# Patient Record
Sex: FEMALE | URBAN - METROPOLITAN AREA
[De-identification: names, ages, dates, MRNs, and addresses within clinical notes are randomized per-mention and may not be internally consistent; named-entity substitution may affect disease eponyms.]

---

## 2022-08-08 ENCOUNTER — TELEPHONE (OUTPATIENT)
Dept: INTERNAL MEDICINE CLINIC | Facility: CLINIC | Age: 27
End: 2022-08-08

## 2022-08-08 NOTE — TELEPHONE ENCOUNTER
Patient is requesting an appointment to see provider sooner. Patient mom is see provider. She states that Provider said she would see her sooner.

## 2022-08-09 NOTE — TELEPHONE ENCOUNTER
Her mom Rk Rogers had asked us to schedule her appointment. Can do 3:30 PM on August 19, 2022 at home still office for physical for new patient.

## 2022-08-18 PROBLEM — Z12.4 PAP SMEAR FOR CERVICAL CANCER SCREENING: Status: ACTIVE | Noted: 2022-08-18

## 2022-08-18 PROBLEM — Z71.85 VACCINE COUNSELING: Status: ACTIVE | Noted: 2022-08-18

## 2022-08-18 PROBLEM — Z00.00 ADULT GENERAL MEDICAL EXAMINATION: Status: ACTIVE | Noted: 2022-08-18

## 2022-08-18 RX ORDER — MOMETASONE FUROATE 1 MG/G
CREAM TOPICAL
COMMUNITY
Start: 2015-09-18 | End: 2022-08-22

## 2022-08-19 ENCOUNTER — OFFICE VISIT (OUTPATIENT)
Dept: INTERNAL MEDICINE CLINIC | Facility: CLINIC | Age: 27
End: 2022-08-19
Payer: COMMERCIAL

## 2022-08-19 VITALS
WEIGHT: 117 LBS | RESPIRATION RATE: 16 BRPM | DIASTOLIC BLOOD PRESSURE: 70 MMHG | SYSTOLIC BLOOD PRESSURE: 110 MMHG | OXYGEN SATURATION: 100 % | HEIGHT: 59 IN | BODY MASS INDEX: 23.59 KG/M2 | HEART RATE: 76 BPM | TEMPERATURE: 99 F

## 2022-08-19 DIAGNOSIS — L70.8 OTHER ACNE: ICD-10-CM

## 2022-08-19 DIAGNOSIS — G44.52 NEW DAILY PERSISTENT HEADACHE: ICD-10-CM

## 2022-08-19 DIAGNOSIS — Z12.4 PAP SMEAR FOR CERVICAL CANCER SCREENING: ICD-10-CM

## 2022-08-19 DIAGNOSIS — R14.0 BLOATED ABDOMEN: ICD-10-CM

## 2022-08-19 DIAGNOSIS — Z71.85 VACCINE COUNSELING: ICD-10-CM

## 2022-08-19 DIAGNOSIS — Z00.00 ADULT GENERAL MEDICAL EXAMINATION: Primary | ICD-10-CM

## 2022-08-19 LAB
ALBUMIN SERPL-MCNC: 4.1 G/DL (ref 3.4–5)
ALBUMIN/GLOB SERPL: 1.1 {RATIO} (ref 1–2)
ALP LIVER SERPL-CCNC: 63 U/L
ALT SERPL-CCNC: 31 U/L
ANION GAP SERPL CALC-SCNC: 7 MMOL/L (ref 0–18)
APPEARANCE: CLEAR
AST SERPL-CCNC: 20 U/L (ref 15–37)
BASOPHILS # BLD AUTO: 0.04 X10(3) UL (ref 0–0.2)
BASOPHILS NFR BLD AUTO: 0.7 %
BILIRUB SERPL-MCNC: 0.6 MG/DL (ref 0.1–2)
BILIRUBIN: NEGATIVE
BUN BLD-MCNC: 14 MG/DL (ref 7–18)
BUN/CREAT SERPL: 19.7 (ref 10–20)
CALCIUM BLD-MCNC: 9 MG/DL (ref 8.5–10.1)
CHLORIDE SERPL-SCNC: 108 MMOL/L (ref 98–112)
CHOLEST SERPL-MCNC: 178 MG/DL (ref ?–200)
CO2 SERPL-SCNC: 25 MMOL/L (ref 21–32)
CREAT BLD-MCNC: 0.71 MG/DL
DEPRECATED RDW RBC AUTO: 41 FL (ref 35.1–46.3)
EOSINOPHIL # BLD AUTO: 0.18 X10(3) UL (ref 0–0.7)
EOSINOPHIL NFR BLD AUTO: 3.2 %
ERYTHROCYTE [DISTWIDTH] IN BLOOD BY AUTOMATED COUNT: 11.9 % (ref 11–15)
FASTING PATIENT LIPID ANSWER: YES
FASTING STATUS PATIENT QL REPORTED: YES
FSH SERPL-ACNC: 5.3 MIU/ML
GFR SERPLBLD BASED ON 1.73 SQ M-ARVRAT: 118 ML/MIN/1.73M2 (ref 60–?)
GLOBULIN PLAS-MCNC: 3.9 G/DL (ref 2.8–4.4)
GLUCOSE (URINE DIPSTICK): NEGATIVE MG/DL
GLUCOSE BLD-MCNC: 85 MG/DL (ref 70–99)
HCT VFR BLD AUTO: 41.3 %
HDLC SERPL-MCNC: 53 MG/DL (ref 40–59)
HGB BLD-MCNC: 13.5 G/DL
IGA SERPL-MCNC: 139 MG/DL (ref 70–312)
IMM GRANULOCYTES # BLD AUTO: 0.01 X10(3) UL (ref 0–1)
IMM GRANULOCYTES NFR BLD: 0.2 %
KETONES (URINE DIPSTICK): NEGATIVE MG/DL
LDLC SERPL CALC-MCNC: 112 MG/DL (ref ?–100)
LEUKOCYTES: NEGATIVE
LH SERPL-ACNC: 6.4 MIU/ML
LYMPHOCYTES # BLD AUTO: 1.53 X10(3) UL (ref 1–4)
LYMPHOCYTES NFR BLD AUTO: 26.9 %
MCH RBC QN AUTO: 30.3 PG (ref 26–34)
MCHC RBC AUTO-ENTMCNC: 32.7 G/DL (ref 31–37)
MCV RBC AUTO: 92.6 FL
MONOCYTES # BLD AUTO: 0.83 X10(3) UL (ref 0.1–1)
MONOCYTES NFR BLD AUTO: 14.6 %
MULTISTIX LOT#: ABNORMAL NUMERIC
NEUTROPHILS # BLD AUTO: 3.09 X10 (3) UL (ref 1.5–7.7)
NEUTROPHILS # BLD AUTO: 3.09 X10(3) UL (ref 1.5–7.7)
NEUTROPHILS NFR BLD AUTO: 54.4 %
NITRITE, URINE: NEGATIVE
NONHDLC SERPL-MCNC: 125 MG/DL (ref ?–130)
OSMOLALITY SERPL CALC.SUM OF ELEC: 290 MOSM/KG (ref 275–295)
PH, URINE: 5.5 (ref 4.5–8)
PLATELET # BLD AUTO: 221 10(3)UL (ref 150–450)
POTASSIUM SERPL-SCNC: 3.6 MMOL/L (ref 3.5–5.1)
PROT SERPL-MCNC: 8 G/DL (ref 6.4–8.2)
PROTEIN (URINE DIPSTICK): NEGATIVE MG/DL
RBC # BLD AUTO: 4.46 X10(6)UL
SODIUM SERPL-SCNC: 140 MMOL/L (ref 136–145)
SPECIFIC GRAVITY: 1.02 (ref 1–1.03)
TRIGL SERPL-MCNC: 69 MG/DL (ref 30–149)
TSI SER-ACNC: 0.98 MIU/ML (ref 0.36–3.74)
URINE-COLOR: YELLOW
UROBILINOGEN,SEMI-QN: 0.2 MG/DL (ref 0–1.9)
VIT B12 SERPL-MCNC: 590 PG/ML (ref 193–986)
VIT D+METAB SERPL-MCNC: 43.3 NG/ML (ref 30–100)
VLDLC SERPL CALC-MCNC: 12 MG/DL (ref 0–30)
WBC # BLD AUTO: 5.7 X10(3) UL (ref 4–11)

## 2022-08-19 PROCEDURE — 3008F BODY MASS INDEX DOCD: CPT | Performed by: INTERNAL MEDICINE

## 2022-08-19 PROCEDURE — 3074F SYST BP LT 130 MM HG: CPT | Performed by: INTERNAL MEDICINE

## 2022-08-19 PROCEDURE — 3078F DIAST BP <80 MM HG: CPT | Performed by: INTERNAL MEDICINE

## 2022-08-19 PROCEDURE — 99385 PREV VISIT NEW AGE 18-39: CPT | Performed by: INTERNAL MEDICINE

## 2022-08-19 PROCEDURE — 99214 OFFICE O/P EST MOD 30 MIN: CPT | Performed by: INTERNAL MEDICINE

## 2022-08-19 PROCEDURE — 36415 COLL VENOUS BLD VENIPUNCTURE: CPT | Performed by: INTERNAL MEDICINE

## 2022-08-19 PROCEDURE — 81003 URINALYSIS AUTO W/O SCOPE: CPT | Performed by: INTERNAL MEDICINE

## 2022-08-19 NOTE — PATIENT INSTRUCTIONS
ASSESSMENT/PLAN:   Adult general medical examination  (primary encounter diagnosis) Check urine. Pap smear for cervical cancer screening Up to date. Check records. Vaccine counseling Refused covid vaccine. New daily persistent headache Maybe sinus dis. Try OTC claritin 10 mg a day. Check eyes. Bloated abdomen Try benefiber 1 tsp. A day. Avoid dairy. Check blood. Other acne Improved. Hold meds. Check blood. Orders Placed This Encounter      Comp Metabolic Panel (14)      CBC With Differential With Platelet      TSH W Reflex To Free T4      Lipid Panel      URINALYSIS, AUTO, W/O SCOPE      FSH      LH (Luteinizing Hormone)      Vitamin B12      Vitamin D      Testosterone,Free And Total      CELIAC DISEASE AB SCREEN W/RFX      Adult Food Allergy Prof      ThinPrep Pap with HPV Reflex, Chlamydia/GC      Chlamydia/Gc Amplification      Meds This Visit:  Requested Prescriptions      No prescriptions requested or ordered in this encounter       Imaging & Referrals:  None     RTC 4 months for FU abdominal issues. RTC 1 yr.  For physical.

## 2022-08-22 ENCOUNTER — PATIENT MESSAGE (OUTPATIENT)
Dept: INTERNAL MEDICINE CLINIC | Facility: CLINIC | Age: 27
End: 2022-08-22

## 2022-08-22 DIAGNOSIS — R19.7 DIARRHEA, UNSPECIFIED TYPE: Primary | ICD-10-CM

## 2022-08-22 LAB
C TRACH DNA SPEC QL NAA+PROBE: NEGATIVE
CLAM IGE QN: <0.1 KUA/L (ref ?–0.1)
CODFISH IGE QN: <0.1 KUA/L (ref ?–0.1)
CORN IGE QN: <0.1 KUA/L (ref ?–0.1)
COW MILK IGE QN: <0.1 KUA/L (ref ?–0.1)
EGG WHITE IGE QN: <0.1 KUA/L (ref ?–0.1)
IGE SERPL-ACNC: 20.7 KU/L (ref 2–214)
N GONORRHOEA DNA SPEC QL NAA+PROBE: NEGATIVE
PEANUT IGE QN: <0.1 KUA/L (ref ?–0.1)
SCALLOP IGE QN: <0.1 KUA/L (ref ?–0.1)
SESAME SEED IGE QN: <0.1 KUA/L (ref ?–0.1)
SHRIMP IGE QN: <0.1 KUA/L (ref ?–0.1)
SOYBEAN IGE QN: <0.1 KUA/L (ref ?–0.1)
WALNUT IGE QN: <0.1 KUA/L (ref ?–0.1)
WHEAT IGE QN: <0.1 KUA/L (ref ?–0.1)

## 2022-08-23 LAB — TTG IGA SER-ACNC: 0.6 U/ML (ref ?–7)

## 2022-08-24 NOTE — TELEPHONE ENCOUNTER
From: Sagar Villatoro  To: Warden Lima. Ramandeep Stovall MD  Sent: 8/22/2022 3:06 PM CDT  Subject: Ele Solis! I put the benefiber in my coffee this morning and it helped a lot. I have gone to the bathroom 2 times today so far without any stomach pain or issues.

## 2022-08-28 LAB
TESTOSTERONE, FREE, S: 0.4 NG/DL
TESTOSTERONE, TOTAL, S: 18 NG/DL

## 2022-09-17 ENCOUNTER — TELEPHONE (OUTPATIENT)
Dept: INTERNAL MEDICINE CLINIC | Facility: CLINIC | Age: 27
End: 2022-09-17

## 2022-09-19 NOTE — TELEPHONE ENCOUNTER
Patient stated that diarrhea cleared up. Stools are solid now. If symptoms reoccur will let us know.

## 2022-12-16 ENCOUNTER — OFFICE VISIT (OUTPATIENT)
Dept: INTERNAL MEDICINE CLINIC | Facility: CLINIC | Age: 27
End: 2022-12-16
Payer: COMMERCIAL

## 2022-12-16 VITALS
SYSTOLIC BLOOD PRESSURE: 104 MMHG | DIASTOLIC BLOOD PRESSURE: 68 MMHG | HEIGHT: 59 IN | OXYGEN SATURATION: 99 % | HEART RATE: 70 BPM | BODY MASS INDEX: 24.19 KG/M2 | WEIGHT: 120 LBS | TEMPERATURE: 98 F | RESPIRATION RATE: 14 BRPM

## 2022-12-16 DIAGNOSIS — Z71.85 VACCINE COUNSELING: Primary | ICD-10-CM

## 2022-12-16 DIAGNOSIS — B36.0 TINEA VERSICOLOR: ICD-10-CM

## 2022-12-16 DIAGNOSIS — E78.2 MIXED HYPERLIPIDEMIA: ICD-10-CM

## 2022-12-16 PROCEDURE — 3078F DIAST BP <80 MM HG: CPT | Performed by: INTERNAL MEDICINE

## 2022-12-16 PROCEDURE — 99213 OFFICE O/P EST LOW 20 MIN: CPT | Performed by: INTERNAL MEDICINE

## 2022-12-16 PROCEDURE — 3008F BODY MASS INDEX DOCD: CPT | Performed by: INTERNAL MEDICINE

## 2022-12-16 PROCEDURE — 3074F SYST BP LT 130 MM HG: CPT | Performed by: INTERNAL MEDICINE

## 2022-12-16 NOTE — PATIENT INSTRUCTIONS
ASSESSMENT/PLAN:   Vaccine counseling  (primary encounter diagnosis) Refused TDap and covid and flu vaccines. Mixed hyperlipidemia Check blood in 2 months. Tinea versicolor try selsum every day for 4 weeks. Call ifno better after 2 weeks.      Orders Placed This Encounter      Lipid Panel      Meds This Visit:  Requested Prescriptions      No prescriptions requested or ordered in this encounter       Imaging & Referrals:  None      RTC after 8-19-23 for physical.

## 2023-07-17 ENCOUNTER — TELEPHONE (OUTPATIENT)
Facility: CLINIC | Age: 28
End: 2023-07-17

## 2023-07-17 NOTE — TELEPHONE ENCOUNTER
Patient called that she has a conflict and cannot make it for physical 8/17, would like a new appt sometime morning or afternoon

## 2023-07-24 NOTE — TELEPHONE ENCOUNTER
Her physical would not be due until August 19. So we can schedule her possibly at any location after August 19?

## 2024-02-01 ENCOUNTER — OFFICE VISIT (OUTPATIENT)
Dept: INTERNAL MEDICINE CLINIC | Facility: CLINIC | Age: 29
End: 2024-02-01
Payer: COMMERCIAL

## 2024-02-01 VITALS
HEIGHT: 58 IN | HEART RATE: 74 BPM | OXYGEN SATURATION: 100 % | BODY MASS INDEX: 25.65 KG/M2 | SYSTOLIC BLOOD PRESSURE: 107 MMHG | WEIGHT: 122.19 LBS | TEMPERATURE: 98 F | DIASTOLIC BLOOD PRESSURE: 70 MMHG

## 2024-02-01 DIAGNOSIS — E78.2 MIXED HYPERLIPIDEMIA: ICD-10-CM

## 2024-02-01 DIAGNOSIS — Z71.85 VACCINE COUNSELING: Primary | ICD-10-CM

## 2024-02-01 DIAGNOSIS — R31.21 ASYMPTOMATIC MICROSCOPIC HEMATURIA: ICD-10-CM

## 2024-02-01 DIAGNOSIS — Z12.4 PAP SMEAR FOR CERVICAL CANCER SCREENING: ICD-10-CM

## 2024-02-01 DIAGNOSIS — Z00.00 ADULT GENERAL MEDICAL EXAMINATION: ICD-10-CM

## 2024-02-01 LAB
ALBUMIN SERPL-MCNC: 4.8 G/DL (ref 3.2–4.8)
ALBUMIN/GLOB SERPL: 1.7 {RATIO} (ref 1–2)
ALP LIVER SERPL-CCNC: 58 U/L
ALT SERPL-CCNC: 16 U/L
ANION GAP SERPL CALC-SCNC: 6 MMOL/L (ref 0–18)
APPEARANCE: CLEAR
AST SERPL-CCNC: 20 U/L (ref ?–34)
BASOPHILS # BLD AUTO: 0.05 X10(3) UL (ref 0–0.2)
BASOPHILS NFR BLD AUTO: 0.9 %
BILIRUB SERPL-MCNC: 0.6 MG/DL (ref 0.3–1.2)
BILIRUBIN: NEGATIVE
BUN BLD-MCNC: 15 MG/DL (ref 9–23)
BUN/CREAT SERPL: 19 (ref 10–20)
CALCIUM BLD-MCNC: 9.9 MG/DL (ref 8.7–10.4)
CHLORIDE SERPL-SCNC: 106 MMOL/L (ref 98–112)
CHOLEST SERPL-MCNC: 182 MG/DL (ref ?–200)
CO2 SERPL-SCNC: 28 MMOL/L (ref 21–32)
CREAT BLD-MCNC: 0.79 MG/DL
DEPRECATED RDW RBC AUTO: 39.5 FL (ref 35.1–46.3)
EGFRCR SERPLBLD CKD-EPI 2021: 103 ML/MIN/1.73M2 (ref 60–?)
EOSINOPHIL # BLD AUTO: 0.1 X10(3) UL (ref 0–0.7)
EOSINOPHIL NFR BLD AUTO: 1.8 %
ERYTHROCYTE [DISTWIDTH] IN BLOOD BY AUTOMATED COUNT: 11.9 % (ref 11–15)
FASTING PATIENT LIPID ANSWER: YES
FASTING STATUS PATIENT QL REPORTED: YES
GLOBULIN PLAS-MCNC: 2.9 G/DL (ref 2.8–4.4)
GLUCOSE (URINE DIPSTICK): NEGATIVE MG/DL
GLUCOSE BLD-MCNC: 70 MG/DL (ref 70–99)
HCT VFR BLD AUTO: 41.7 %
HDLC SERPL-MCNC: 55 MG/DL (ref 40–59)
HGB BLD-MCNC: 14.1 G/DL
IMM GRANULOCYTES # BLD AUTO: 0.01 X10(3) UL (ref 0–1)
IMM GRANULOCYTES NFR BLD: 0.2 %
KETONES (URINE DIPSTICK): NEGATIVE MG/DL
LDLC SERPL CALC-MCNC: 115 MG/DL (ref ?–100)
LYMPHOCYTES # BLD AUTO: 1.73 X10(3) UL (ref 1–4)
LYMPHOCYTES NFR BLD AUTO: 31.9 %
MCH RBC QN AUTO: 30.9 PG (ref 26–34)
MCHC RBC AUTO-ENTMCNC: 33.8 G/DL (ref 31–37)
MCV RBC AUTO: 91.2 FL
MONOCYTES # BLD AUTO: 0.52 X10(3) UL (ref 0.1–1)
MONOCYTES NFR BLD AUTO: 9.6 %
MULTISTIX LOT#: ABNORMAL NUMERIC
NEUTROPHILS # BLD AUTO: 3.01 X10 (3) UL (ref 1.5–7.7)
NEUTROPHILS # BLD AUTO: 3.01 X10(3) UL (ref 1.5–7.7)
NEUTROPHILS NFR BLD AUTO: 55.6 %
NITRITE, URINE: NEGATIVE
NONHDLC SERPL-MCNC: 127 MG/DL (ref ?–130)
OSMOLALITY SERPL CALC.SUM OF ELEC: 289 MOSM/KG (ref 275–295)
PH, URINE: 5.5 (ref 4.5–8)
PLATELET # BLD AUTO: 272 10(3)UL (ref 150–450)
POTASSIUM SERPL-SCNC: 4 MMOL/L (ref 3.5–5.1)
PROT SERPL-MCNC: 7.7 G/DL (ref 5.7–8.2)
PROTEIN (URINE DIPSTICK): NEGATIVE MG/DL
RBC # BLD AUTO: 4.57 X10(6)UL
SODIUM SERPL-SCNC: 140 MMOL/L (ref 136–145)
SPECIFIC GRAVITY: 1.02 (ref 1–1.03)
TRIGL SERPL-MCNC: 66 MG/DL (ref 30–149)
TSI SER-ACNC: 0.65 MIU/ML (ref 0.55–4.78)
URINE-COLOR: YELLOW
UROBILINOGEN,SEMI-QN: 0.2 MG/DL (ref 0–1.9)
VLDLC SERPL CALC-MCNC: 11 MG/DL (ref 0–30)
WBC # BLD AUTO: 5.4 X10(3) UL (ref 4–11)

## 2024-02-01 PROCEDURE — 3074F SYST BP LT 130 MM HG: CPT | Performed by: INTERNAL MEDICINE

## 2024-02-01 PROCEDURE — 81003 URINALYSIS AUTO W/O SCOPE: CPT | Performed by: INTERNAL MEDICINE

## 2024-02-01 PROCEDURE — 3078F DIAST BP <80 MM HG: CPT | Performed by: INTERNAL MEDICINE

## 2024-02-01 PROCEDURE — 3008F BODY MASS INDEX DOCD: CPT | Performed by: INTERNAL MEDICINE

## 2024-02-01 PROCEDURE — 99395 PREV VISIT EST AGE 18-39: CPT | Performed by: INTERNAL MEDICINE

## 2024-02-01 RX ORDER — ALBUTEROL SULFATE 90 UG/1
AEROSOL, METERED RESPIRATORY (INHALATION)
COMMUNITY

## 2024-02-01 NOTE — PATIENT INSTRUCTIONS
ASSESSMENT/PLAN:     Encounter Diagnoses   Name Primary?    Vaccine counseling Holding all vaccines.    Yes    Pap smear for cervical cancer screening FU gyne . for pap after 11-22-24.        Mixed hyperlipidemia Check blood.        Adult general medical examination Check urine. Check blood.         Orders Placed This Encounter   Procedures    Lipid Panel    Comp Metabolic Panel (14)    CBC With Differential With Platelet    TSH W Reflex To Free T4    URINALYSIS, AUTO, W/O SCOPE    Chlamydia/Gc Amplification       Meds This Visit:  Requested Prescriptions      No prescriptions requested or ordered in this encounter       Imaging & Referrals:  None      RTC 1 yr. for physical.

## 2024-02-01 NOTE — PROGRESS NOTES
HPI:    Patient ID: Romina Mcdonnell is a 28 year old female.    Romina Mcdonnell is a 28 year old female who presents for a complete physical exam.   HPI:     Chief Complaint   Patient presents with    Physical     Patient states she is here for Annual Physical Examination         Patient's last menstrual period was 01/31/2024.   Symptoms: denies discharge, itching, burning or dysuria, periods are regular, flow is 5-6 days. Heavy X 1-2 days pad q4-5 hrs. First menses: 9 yo.     /70 (BP Location: Right arm, Patient Position: Sitting, Cuff Size: adult)   Pulse 74   Temp 98.2 °F (36.8 °C) (Oral)   Ht 4' 10\" (1.473 m)   Wt 122 lb 3.2 oz (55.4 kg)   LMP 01/31/2024   SpO2 100%   BMI 25.54 kg/m²    Wt Readings from Last 4 Encounters:   02/01/24 122 lb 3.2 oz (55.4 kg)   12/16/22 120 lb (54.4 kg)   08/19/22 117 lb (53.1 kg)     Body mass index is 25.54 kg/m².     Labs:   Lab Results   Component Value Date/Time    WBC 5.7 08/19/2022 10:09 AM    HGB 13.5 08/19/2022 10:09 AM    .0 08/19/2022 10:09 AM      Lab Results   Component Value Date/Time    GLU 85 08/19/2022 10:09 AM     08/19/2022 10:09 AM    K 3.6 08/19/2022 10:09 AM     08/19/2022 10:09 AM    CO2 25.0 08/19/2022 10:09 AM    CREATSERUM 0.71 08/19/2022 10:09 AM    CA 9.0 08/19/2022 10:09 AM    ALB 4.1 08/19/2022 10:09 AM    TP 8.0 08/19/2022 10:09 AM    ALKPHO 63 08/19/2022 10:09 AM    AST 20 08/19/2022 10:09 AM    ALT 31 08/19/2022 10:09 AM    BILT 0.6 08/19/2022 10:09 AM    TSH 0.980 08/19/2022 10:09 AM        Lab Results   Component Value Date/Time    CHOLEST 178 08/19/2022 10:09 AM    HDL 53 08/19/2022 10:09 AM    TRIG 69 08/19/2022 10:09 AM     (H) 08/19/2022 10:09 AM    NONHDLC 125 08/19/2022 10:09 AM       No results found for: \"A1C\"   Lab Results   Component Value Date    VITD 43.3 08/19/2022         No recommendations at this time    No current outpatient medications on file.      No past medical history on file.   Past  Surgical History:   Procedure Laterality Date    OTHER SURGICAL HISTORY      Rhinoplasty      Family History   Problem Relation Age of Onset    High Cholesterol Father     Depression Father       Social History:  Social History     Socioeconomic History    Marital status: Single   Occupational History    Occupation: dance instructor   Tobacco Use    Smoking status: Never    Smokeless tobacco: Never   Substance and Sexual Activity    Alcohol use: Yes     Comment: Social    Drug use: Never    Sexual activity: Yes         OB History   No obstetric history on file.      Hx of Pap: all Paps normal         Labs:   Lab Results   Component Value Date/Time    GLU 85 08/19/2022 10:09 AM     08/19/2022 10:09 AM    K 3.6 08/19/2022 10:09 AM     08/19/2022 10:09 AM    CO2 25.0 08/19/2022 10:09 AM    CREATSERUM 0.71 08/19/2022 10:09 AM    CA 9.0 08/19/2022 10:09 AM    AST 20 08/19/2022 10:09 AM    ALT 31 08/19/2022 10:09 AM    TSH 0.980 08/19/2022 10:09 AM        Lab Results   Component Value Date/Time    CHOLEST 178 08/19/2022 10:09 AM    HDL 53 08/19/2022 10:09 AM    TRIG 69 08/19/2022 10:09 AM     (H) 08/19/2022 10:09 AM    NONHDLC 125 08/19/2022 10:09 AM           HPI      Review of Systems   Constitutional:  Negative for activity change, appetite change, chills, diaphoresis, fatigue, fever and unexpected weight change.   HENT:  Negative for congestion, dental problem, drooling, ear discharge, ear pain, facial swelling, hearing loss, mouth sores, nosebleeds, postnasal drip, rhinorrhea, sinus pressure, sinus pain, sneezing, sore throat, tinnitus, trouble swallowing and voice change.    Eyes:  Negative for photophobia, pain, discharge, redness, itching and visual disturbance.   Respiratory:  Negative for apnea, cough, choking, chest tightness, shortness of breath, wheezing and stridor.    Cardiovascular:  Negative for chest pain, palpitations and leg swelling.   Gastrointestinal:  Negative for abdominal  distention, abdominal pain, anal bleeding, blood in stool, constipation, diarrhea, nausea, rectal pain and vomiting.   Endocrine: Negative for cold intolerance, heat intolerance, polydipsia, polyphagia and polyuria.   Genitourinary:  Negative for decreased urine volume, difficulty urinating, dysuria, flank pain, frequency, hematuria, menstrual problem, pelvic pain, urgency, vaginal bleeding, vaginal discharge and vaginal pain.   Skin:  Negative for rash.   Neurological:  Negative for dizziness, tremors, seizures, syncope, facial asymmetry, speech difficulty, weakness, light-headedness, numbness and headaches.   Psychiatric/Behavioral:  Negative for agitation, behavioral problems, confusion, decreased concentration, dysphoric mood, hallucinations, self-injury, sleep disturbance and suicidal ideas. The patient is not nervous/anxious and is not hyperactive.    All other systems reviewed and are negative.        No current outpatient medications on file.     Allergies:No Known Allergies    HISTORY:  No past medical history on file.   Past Surgical History:   Procedure Laterality Date    OTHER SURGICAL HISTORY      Rhinoplasty      Family History   Problem Relation Age of Onset    High Cholesterol Father     Depression Father       Social History:   Social History     Socioeconomic History    Marital status: Single   Occupational History    Occupation: dance instructor   Tobacco Use    Smoking status: Never    Smokeless tobacco: Never   Substance and Sexual Activity    Alcohol use: Yes     Comment: Social    Drug use: Never    Sexual activity: Yes        Exercise level: exercises 4 times a  week (Core power X 1 hr.) and has been following low salt diet.  Weight has been stable.  Wt Readings from Last 3 Encounters:   02/01/24 122 lb 3.2 oz (55.4 kg)   12/16/22 120 lb (54.4 kg)   08/19/22 117 lb (53.1 kg)     BP Readings from Last 3 Encounters:   02/01/24 107/70   12/16/22 104/68   08/19/22 110/70       Labs:   Lab Results    Component Value Date/Time    GLU 85 08/19/2022 10:09 AM     08/19/2022 10:09 AM    K 3.6 08/19/2022 10:09 AM     08/19/2022 10:09 AM    CO2 25.0 08/19/2022 10:09 AM    CREATSERUM 0.71 08/19/2022 10:09 AM    CA 9.0 08/19/2022 10:09 AM    AST 20 08/19/2022 10:09 AM    ALT 31 08/19/2022 10:09 AM    TSH 0.980 08/19/2022 10:09 AM        Lab Results   Component Value Date/Time    CHOLEST 178 08/19/2022 10:09 AM    HDL 53 08/19/2022 10:09 AM    TRIG 69 08/19/2022 10:09 AM     (H) 08/19/2022 10:09 AM    NONHDLC 125 08/19/2022 10:09 AM          PHYSICAL EXAM:   /70 (BP Location: Right arm, Patient Position: Sitting, Cuff Size: adult)   Pulse 74   Temp 98.2 °F (36.8 °C) (Oral)   Ht 4' 10\" (1.473 m)   Wt 122 lb 3.2 oz (55.4 kg)   LMP 01/31/2024   SpO2 100%   BMI 25.54 kg/m²   BP Readings from Last 3 Encounters:   02/01/24 107/70   12/16/22 104/68   08/19/22 110/70     Wt Readings from Last 3 Encounters:   02/01/24 122 lb 3.2 oz (55.4 kg)   12/16/22 120 lb (54.4 kg)   08/19/22 117 lb (53.1 kg)       Physical Exam  Vitals and nursing note reviewed.   Constitutional:       General: She is not in acute distress.     Appearance: Normal appearance. She is well-developed and well-groomed. She is not ill-appearing, toxic-appearing or diaphoretic.      Interventions: She is not intubated.  HENT:      Head: Normocephalic and atraumatic.      Right Ear: Tympanic membrane, ear canal and external ear normal. No decreased hearing noted. No laceration, drainage, swelling or tenderness. No middle ear effusion. There is no impacted cerumen. No foreign body. No mastoid tenderness. No PE tube. No hemotympanum. Tympanic membrane is not injected, scarred, perforated, erythematous, retracted or bulging. Tympanic membrane has normal mobility.      Left Ear: Tympanic membrane, ear canal and external ear normal. No decreased hearing noted. No laceration, drainage, swelling or tenderness.  No middle ear effusion. There  is no impacted cerumen. No foreign body. No mastoid tenderness. No PE tube. No hemotympanum. Tympanic membrane is not injected, scarred, perforated, erythematous, retracted or bulging. Tympanic membrane has normal mobility.      Nose:      Right Sinus: No maxillary sinus tenderness or frontal sinus tenderness.      Left Sinus: No maxillary sinus tenderness or frontal sinus tenderness.      Mouth/Throat:      Lips: Pink. No lesions.      Mouth: Mucous membranes are moist. No injury, lacerations, oral lesions or angioedema.      Dentition: Normal dentition. Does not have dentures. No dental tenderness, gingival swelling, dental caries, dental abscesses or gum lesions.      Tongue: No lesions. Tongue does not deviate from midline.      Palate: No mass and lesions.      Pharynx: Oropharynx is clear. Uvula midline. No pharyngeal swelling, oropharyngeal exudate, posterior oropharyngeal erythema or uvula swelling.      Tonsils: No tonsillar exudate or tonsillar abscesses.   Eyes:      General: Lids are normal. No scleral icterus.        Right eye: No foreign body, discharge or hordeolum.         Left eye: No foreign body, discharge or hordeolum.      Extraocular Movements: Extraocular movements intact.      Right eye: Normal extraocular motion and no nystagmus.      Left eye: Normal extraocular motion and no nystagmus.      Conjunctiva/sclera: Conjunctivae normal.      Right eye: Right conjunctiva is not injected. No chemosis, exudate or hemorrhage.     Left eye: Left conjunctiva is not injected. No chemosis, exudate or hemorrhage.     Pupils: Pupils are equal, round, and reactive to light.   Neck:      Thyroid: No thyroid mass, thyromegaly or thyroid tenderness.      Vascular: Normal carotid pulses. No carotid bruit, hepatojugular reflux or JVD.      Trachea: Trachea and phonation normal. No tracheal tenderness, tracheostomy, abnormal tracheal secretions or tracheal deviation.   Cardiovascular:      Rate and Rhythm:  Normal rate and regular rhythm.      Pulses: Normal pulses.           Carotid pulses are 2+ on the right side and 2+ on the left side.       Radial pulses are 2+ on the right side and 2+ on the left side.        Dorsalis pedis pulses are 2+ on the right side and 2+ on the left side.        Posterior tibial pulses are 2+ on the right side and 2+ on the left side.      Heart sounds: Normal heart sounds, S1 normal and S2 normal.   Pulmonary:      Effort: Pulmonary effort is normal. No tachypnea, bradypnea, accessory muscle usage, prolonged expiration, respiratory distress or retractions. She is not intubated.      Breath sounds: Normal breath sounds and air entry. No stridor, decreased air movement or transmitted upper airway sounds. No decreased breath sounds, wheezing, rhonchi or rales.   Chest:      Chest wall: No tenderness.      Comments: Breast exam deferred.  Patient seen GYN per patient.  Abdominal:      General: Bowel sounds are normal. There is no distension.      Palpations: Abdomen is soft. Abdomen is not rigid. There is no fluid wave, hepatomegaly, splenomegaly or mass.      Tenderness: There is no abdominal tenderness. There is no right CVA tenderness, left CVA tenderness, guarding or rebound.   Musculoskeletal:      Cervical back: Neck supple. No tenderness.      Right lower leg: No edema.      Left lower leg: No edema.   Lymphadenopathy:      Head:      Right side of head: No submental, submandibular, preauricular, posterior auricular or occipital adenopathy.      Left side of head: No submental, submandibular, preauricular, posterior auricular or occipital adenopathy.      Cervical: No cervical adenopathy.      Right cervical: No superficial, deep or posterior cervical adenopathy.     Left cervical: No superficial, deep or posterior cervical adenopathy.      Upper Body:      Right upper body: No supraclavicular adenopathy.      Left upper body: No supraclavicular adenopathy.   Skin:     General: Skin  is warm and dry.      Coloration: Skin is not pale.      Findings: No erythema or rash.   Neurological:      Mental Status: She is alert and oriented to person, place, and time.   Psychiatric:         Mood and Affect: Mood normal.         Speech: Speech normal.         Behavior: Behavior normal. Behavior is cooperative.              ASSESSMENT/PLAN:     Encounter Diagnoses   Name Primary?    Vaccine counseling Holding all vaccines.    Yes    Pap smear for cervical cancer screening FU gyne . for pap after 11-22-24.        Mixed hyperlipidemia Check blood.        Adult general medical examination Check urine. Check blood.         Orders Placed This Encounter   Procedures    Lipid Panel    Comp Metabolic Panel (14)    CBC With Differential With Platelet    TSH W Reflex To Free T4    URINALYSIS, AUTO, W/O SCOPE    Chlamydia/Gc Amplification       Meds This Visit:  Requested Prescriptions      No prescriptions requested or ordered in this encounter       Imaging & Referrals:  None      RTC 1 yr. for physical.

## 2024-02-02 LAB
C TRACH DNA SPEC QL NAA+PROBE: NEGATIVE
N GONORRHOEA DNA SPEC QL NAA+PROBE: NEGATIVE

## 2024-02-02 NOTE — PROGRESS NOTES
CBC Normal (white blood cells and red blood cells and platelets),   CMP Normal (electrolytes, sugar, kidney and liver functions),   Lipid (choilesterol) is good,   Thyroid is good.

## 2024-06-19 ENCOUNTER — HOSPITAL ENCOUNTER (OUTPATIENT)
Age: 29
Discharge: HOME | End: 2024-06-19

## 2024-06-19 VITALS
TEMPERATURE: 98 F | HEART RATE: 68 BPM | RESPIRATION RATE: 16 BRPM | SYSTOLIC BLOOD PRESSURE: 115 MMHG | DIASTOLIC BLOOD PRESSURE: 75 MMHG | OXYGEN SATURATION: 99 %

## 2024-06-19 DIAGNOSIS — H01.004 BLEPHARITIS OF LEFT UPPER EYELID, UNSPECIFIED TYPE: Primary | ICD-10-CM

## 2024-06-19 PROCEDURE — 99203 OFFICE O/P NEW LOW 30 MIN: CPT | Performed by: PHYSICIAN ASSISTANT

## 2024-06-19 RX ORDER — ERYTHROMYCIN 5 MG/G
1 OINTMENT OPHTHALMIC EVERY 6 HOURS
Qty: 3.5 G | Refills: 0 | Status: SHIPPED | OUTPATIENT
Start: 2024-06-19 | End: 2024-06-24

## 2024-06-19 NOTE — ED PROVIDER NOTES
Patient Seen in: [facility]      History     Chief Complaint   Patient presents with    Eye Pain    Swelling Edema     Stated Complaint: L eye Pain    Subjective:   HPI    29 female presenting for evaluation of swelling to the left upper eyelid, redness which started 2 days ago. There is associated tenderness to the lid. No change in vision, no drainage from the eye.  The patient does not wear contacts.  Symptoms started few days after her eye make-up on.  Patient also received Botox the day of symptoms starting.    Objective:   History reviewed. No pertinent past medical history.           Past Surgical History:   Procedure Laterality Date    Other surgical history      Rhinoplasty                Social History     Socioeconomic History    Marital status: Single   Occupational History    Occupation: dance instructor   Tobacco Use    Smoking status: Never    Smokeless tobacco: Never   Substance and Sexual Activity    Alcohol use: Yes     Comment: Social    Drug use: Never    Sexual activity: Yes              Review of Systems    Positive for stated complaint: L eye Pain  Other systems are as noted in HPI.  Constitutional and vital signs reviewed.      All other systems reviewed and negative except as noted above.    Physical Exam     ED Triage Vitals [06/19/24 1056]   /75   Pulse 68   Resp 16   Temp 97.8 °F (36.6 °C)   Temp src Temporal   SpO2 99 %   O2 Device None (Room air)       Current Vitals:   No data recorded          Physical Exam  Vitals and nursing note reviewed.   Constitutional:       General: She is not in acute distress.  HENT:      Head: Normocephalic and atraumatic.      Right Ear: External ear normal.      Left Ear: External ear normal.      Nose: Nose normal.      Mouth/Throat:      Mouth: Mucous membranes are moist.   Eyes:      Extraocular Movements: Extraocular movements intact.      Pupils: Pupils are equal, round, and reactive to light.        Comments: There is edema,  erythema to the left upper eyelid with associated tenderness.  I do not appreciate any lesion externally or internally.  There is no pain with extraocular movements and no discharge noted.  The conjunctiva are normal in appearance   Cardiovascular:      Rate and Rhythm: Normal rate.   Pulmonary:      Effort: Pulmonary effort is normal.   Abdominal:      General: Abdomen is flat.   Musculoskeletal:         General: Normal range of motion.      Cervical back: Normal range of motion.   Skin:     General: Skin is warm.   Neurological:      General: No focal deficit present.      Mental Status: She is alert and oriented to person, place, and time.   Psychiatric:         Mood and Affect: Mood normal.         Behavior: Behavior normal.               ED Course   Labs Reviewed - No data to display     29-year-old female presenting for evaluation of swelling to the left upper eyelid which started 2 days ago.  No vision changes.  Patient is nontoxic-appearing.  Afebrile.  Ddx-blepharitis, show lazy M, hordeolum, preseptal cellulitis  Physical exam most suggestive of blepharitis v.  She will lazy M.  I discussed continued use of warm compresses, erythromycin ointment and ongoing monitoring.  Ophthalmology follow-up if the symptoms do not improve              MDM                                         Medical Decision Making      Disposition and Plan     Clinical Impression:  1. Blepharitis of left upper eyelid, unspecified type         Disposition:  Discharge  6/19/2024 11:50 am    Follow-up:  Linda Collins MD  [address] [facility] [address] [address]  [phone]          Shelton Cuevas MD  [address] [facility]  [address]  [facility] [address]  [phone]      eye follow up          Medications Prescribed:  Discharge Medication List as of 6/19/2024 11:50 AM        START taking these medications    Details   erythromycin 5 MG/GM Ophthalmic Ointment Place 1 Application into the left eye every 6 (six) hours for 5 days.,  Normal, Disp-3.5 g, R-0

## 2024-06-19 NOTE — ED INITIAL ASSESSMENT (HPI)
Pt reports eyelid edema, which started Monday. Hot compresses applied. Concerned for possible stye or infection of eyelid

## 2025-02-06 NOTE — PATIENT INSTRUCTIONS
ASSESSMENT/PLAN:     Encounter Diagnoses   Name Primary?    Adult general medical examination Check urine. Check blood.    Yes    Mixed hyperlipidemia Check blood.        Pap smear for cervical cancer screening Up to date.        Vaccine counseling Holding Tdap and covid booster and flu vaccines.           Miscarriage (HCC) Check blood FU gyne.         Orders Placed This Encounter   Procedures    Lipid Panel    CBC With Differential With Platelet    Comp Metabolic Panel (14)    TSH W Reflex To Free T4    URINALYSIS, AUTO, W/O SCOPE    LH (Luteinizing Hormone)    FSH    Estrogens Fractionated, S [E]    Magnesium       Meds This Visit:  Requested Prescriptions      No prescriptions requested or ordered in this encounter       Imaging & Referrals:  None      RTC 1 yr. for physical.

## 2025-02-06 NOTE — PROGRESS NOTES
HPI:    Patient ID: Romina Villar is a 29 year old female.    Romina Villar is a 29 year old female who presents for a complete physical exam.   HPI:     Chief Complaint   Patient presents with    Physical     Patient states she is here for an Annual Physical Examination.         No LMP recorded.   Symptoms: denies discharge, itching, burning or dysuria, periods are regular, flow is 5 days days q28 days.  LNMP: 1-28-25.     BP 98/61 (BP Location: Left arm, Patient Position: Sitting, Cuff Size: adult)   Pulse 74   Temp 98 °F (36.7 °C) (Oral)   Ht 4' 10\" (1.473 m)   Wt 123 lb 3.2 oz (55.9 kg)   SpO2 100%   BMI 25.75 kg/m²    Wt Readings from Last 4 Encounters:   02/06/25 123 lb 3.2 oz (55.9 kg)   02/01/24 122 lb 3.2 oz (55.4 kg)   12/16/22 120 lb (54.4 kg)   08/19/22 117 lb (53.1 kg)     Body mass index is 25.75 kg/m².     Labs:   Lab Results   Component Value Date/Time    WBC 5.4 02/01/2024 11:33 AM    HGB 14.1 02/01/2024 11:33 AM    .0 02/01/2024 11:33 AM      Lab Results   Component Value Date/Time    GLU 70 02/01/2024 11:33 AM     02/01/2024 11:33 AM    K 4.0 02/01/2024 11:33 AM     02/01/2024 11:33 AM    CO2 28.0 02/01/2024 11:33 AM    CREATSERUM 0.79 02/01/2024 11:33 AM    CA 9.9 02/01/2024 11:33 AM    ALB 4.8 02/01/2024 11:33 AM    TP 7.7 02/01/2024 11:33 AM    ALKPHO 58 02/01/2024 11:33 AM    AST 20 02/01/2024 11:33 AM    ALT 16 02/01/2024 11:33 AM    BILT 0.6 02/01/2024 11:33 AM    TSH 0.646 02/01/2024 11:33 AM        Lab Results   Component Value Date/Time    CHOLEST 182 02/01/2024 11:33 AM    HDL 55 02/01/2024 11:33 AM    TRIG 66 02/01/2024 11:33 AM     (H) 02/01/2024 11:33 AM    NONHDLC 127 02/01/2024 11:33 AM       No results found for: \"A1C\"   Lab Results   Component Value Date    VITD 43.3 08/19/2022         No recommendations at this time    No current outpatient medications on file.      History reviewed. No pertinent past medical history.   Past Surgical  History:   Procedure Laterality Date    Other surgical history      Rhinoplasty      Family History   Problem Relation Age of Onset    High Cholesterol Father     Depression Father       Social History:  Social History     Socioeconomic History    Marital status:    Occupational History    Occupation: dance instructor   Tobacco Use    Smoking status: Never    Smokeless tobacco: Never   Vaping Use    Vaping status: Never Used   Substance and Sexual Activity    Alcohol use: Yes     Comment: Social    Drug use: Never    Sexual activity: Yes           GYNE HISTORY:  OB History   No obstetric history on file.        Hx of Pap: all Paps normal           Labs:   Lab Results   Component Value Date/Time    GLU 70 02/01/2024 11:33 AM     02/01/2024 11:33 AM    K 4.0 02/01/2024 11:33 AM     02/01/2024 11:33 AM    CO2 28.0 02/01/2024 11:33 AM    CREATSERUM 0.79 02/01/2024 11:33 AM    CA 9.9 02/01/2024 11:33 AM    AST 20 02/01/2024 11:33 AM    ALT 16 02/01/2024 11:33 AM    TSH 0.646 02/01/2024 11:33 AM        Lab Results   Component Value Date/Time    CHOLEST 182 02/01/2024 11:33 AM    HDL 55 02/01/2024 11:33 AM    TRIG 66 02/01/2024 11:33 AM     (H) 02/01/2024 11:33 AM    NONHDLC 127 02/01/2024 11:33 AM           Miscarriage 10-24. Saw sac and not fetus at 8 weeks. Spotting on own 1 week later.  Usually has diarrhea before her cycle.  Had noticed prior to losing her baby and after she lost her baby had diarrhea.  Wants hormone levels to be checked.  Told to GYN and they can do progesterone with next pregnancy.          Review of Systems   Constitutional:  Negative for activity change, appetite change, chills, diaphoresis, fatigue, fever and unexpected weight change.   HENT:  Negative for congestion, dental problem, drooling, ear discharge, ear pain, facial swelling, hearing loss, mouth sores, nosebleeds, postnasal drip, rhinorrhea, sinus pressure, sinus pain, sneezing, sore throat, tinnitus, trouble  swallowing and voice change.    Eyes:  Negative for photophobia, pain, discharge, redness, itching and visual disturbance.   Respiratory:  Negative for apnea, cough, choking, chest tightness, shortness of breath, wheezing and stridor.    Cardiovascular:  Negative for chest pain, palpitations and leg swelling.   Gastrointestinal:  Negative for abdominal distention, abdominal pain, anal bleeding, blood in stool, constipation, diarrhea, nausea, rectal pain and vomiting.   Endocrine: Negative for cold intolerance, heat intolerance, polydipsia, polyphagia and polyuria.   Genitourinary:  Negative for decreased urine volume, difficulty urinating, dysuria, flank pain, frequency, hematuria, menstrual problem, pelvic pain, urgency, vaginal bleeding, vaginal discharge and vaginal pain.   Skin:  Negative for rash.   Neurological:  Negative for dizziness, tremors, seizures, syncope, facial asymmetry, speech difficulty, weakness, light-headedness, numbness and headaches.   Psychiatric/Behavioral:  Negative for agitation, behavioral problems, confusion, decreased concentration, dysphoric mood, hallucinations, self-injury, sleep disturbance and suicidal ideas. The patient is not nervous/anxious and is not hyperactive.    All other systems reviewed and are negative.        No current outpatient medications on file.     Allergies:Allergies[1]    HISTORY:  History reviewed. No pertinent past medical history.   Past Surgical History:   Procedure Laterality Date    Other surgical history      Rhinoplasty      Family History   Problem Relation Age of Onset    High Cholesterol Father     Depression Father       Social History:   Social History     Socioeconomic History    Marital status:    Occupational History    Occupation: dance instructor   Tobacco Use    Smoking status: Never    Smokeless tobacco: Never   Vaping Use    Vaping status: Never Used   Substance and Sexual Activity    Alcohol use: Yes     Comment: Social    Drug  use: Never    Sexual activity: Yes     Social Drivers of Health     Food Insecurity: Low Risk  (11/6/2024)    Received from [facility]    Food Insecurity     Have there been times that your food ran out, and you didn't have money to get more?: No     Are there times that you worry that this might happen?: No   Transportation Needs: Low Risk  (11/6/2024)    Received from [facility]    Transportation Needs     Do you have trouble getting transportation to medical appointments?: No     How do you normally get to and from your appointments?: Family/Friend        Exercise level: exercises 7 times a  week (walks X 30 minutes and yoga X 1 hr.) and has been following low salt diet.  Weight has been stable.  Wt Readings from Last 3 Encounters:   02/06/25 123 lb 3.2 oz (55.9 kg)   02/01/24 122 lb 3.2 oz (55.4 kg)   12/16/22 120 lb (54.4 kg)     BP Readings from Last 3 Encounters:   02/06/25 98/61   06/19/24 115/75   02/01/24 107/70       Labs:   Lab Results   Component Value Date/Time    GLU 70 02/01/2024 11:33 AM     02/01/2024 11:33 AM    K 4.0 02/01/2024 11:33 AM     02/01/2024 11:33 AM    CO2 28.0 02/01/2024 11:33 AM    CREATSERUM 0.79 02/01/2024 11:33 AM    CA 9.9 02/01/2024 11:33 AM    AST 20 02/01/2024 11:33 AM    ALT 16 02/01/2024 11:33 AM    TSH 0.646 02/01/2024 11:33 AM        Lab Results   Component Value Date/Time    CHOLEST 182 02/01/2024 11:33 AM    HDL 55 02/01/2024 11:33 AM    TRIG 66 02/01/2024 11:33 AM     (H) 02/01/2024 11:33 AM    NONHDLC 127 02/01/2024 11:33 AM          PHYSICAL EXAM:   BP 98/61 (BP Location: Left arm, Patient Position: Sitting, Cuff Size: adult)   Pulse 74   Temp 98 °F (36.7 °C) (Oral)   Ht 4' 10\" (1.473 m)   Wt 123 lb 3.2 oz (55.9 kg)   SpO2 100%   BMI 25.75 kg/m²   BP Readings from Last 3 Encounters:   02/06/25 98/61   06/19/24 115/75   02/01/24 107/70     Wt Readings from Last 3 Encounters:   02/06/25 123 lb 3.2 oz  (55.9 kg)   02/01/24 122 lb 3.2 oz (55.4 kg)   12/16/22 120 lb (54.4 kg)       Physical Exam  Vitals and nursing note reviewed.   Constitutional:       General: She is not in acute distress.     Appearance: Normal appearance. She is well-developed and well-groomed. She is not ill-appearing, toxic-appearing or diaphoretic.      Interventions: She is not intubated.  HENT:      Head: Normocephalic and atraumatic.      Right Ear: Tympanic membrane, ear canal and external ear normal. No decreased hearing noted. No laceration, drainage, swelling or tenderness. No middle ear effusion. There is no impacted cerumen. No foreign body. No mastoid tenderness. No PE tube. No hemotympanum. Tympanic membrane is not injected, scarred, perforated, erythematous, retracted or bulging. Tympanic membrane has normal mobility.      Left Ear: Tympanic membrane, ear canal and external ear normal. No decreased hearing noted. No laceration, drainage, swelling or tenderness.  No middle ear effusion. There is no impacted cerumen. No foreign body. No mastoid tenderness. No PE tube. No hemotympanum. Tympanic membrane is not injected, scarred, perforated, erythematous, retracted or bulging. Tympanic membrane has normal mobility.      Nose:      Right Sinus: No maxillary sinus tenderness or frontal sinus tenderness.      Left Sinus: No maxillary sinus tenderness or frontal sinus tenderness.      Mouth/Throat:      Lips: Pink. No lesions.      Mouth: Mucous membranes are moist. No injury, lacerations, oral lesions or angioedema.      Dentition: No dental tenderness, gingival swelling, dental abscesses or gum lesions.      Tongue: No lesions. Tongue does not deviate from midline.      Palate: No mass and lesions.      Pharynx: Oropharynx is clear. Uvula midline. No pharyngeal swelling, oropharyngeal exudate, posterior oropharyngeal erythema, uvula swelling or postnasal drip.      Tonsils: No tonsillar exudate or tonsillar abscesses.   Eyes:       General: Lids are normal. Gaze aligned appropriately. No scleral icterus.        Right eye: No foreign body, discharge or hordeolum.         Left eye: No foreign body, discharge or hordeolum.      Extraocular Movements: Extraocular movements intact.      Right eye: Normal extraocular motion and no nystagmus.      Left eye: Normal extraocular motion and no nystagmus.      Conjunctiva/sclera: Conjunctivae normal.      Right eye: Right conjunctiva is not injected. No chemosis, exudate or hemorrhage.     Left eye: Left conjunctiva is not injected. No chemosis, exudate or hemorrhage.     Pupils: Pupils are equal, round, and reactive to light.   Neck:      Thyroid: No thyroid mass, thyromegaly or thyroid tenderness.      Vascular: Normal carotid pulses. No carotid bruit, hepatojugular reflux or JVD.      Trachea: Trachea and phonation normal. No tracheal tenderness, tracheostomy, abnormal tracheal secretions or tracheal deviation.   Cardiovascular:      Rate and Rhythm: Normal rate and regular rhythm.      Pulses: Normal pulses.           Carotid pulses are 2+ on the right side and 2+ on the left side.       Radial pulses are 2+ on the right side and 2+ on the left side.        Dorsalis pedis pulses are 2+ on the right side and 2+ on the left side.        Posterior tibial pulses are 2+ on the right side and 2+ on the left side.      Heart sounds: Normal heart sounds, S1 normal and S2 normal.   Pulmonary:      Effort: Pulmonary effort is normal. No tachypnea, bradypnea, accessory muscle usage, prolonged expiration, respiratory distress or retractions. She is not intubated.      Breath sounds: Normal breath sounds and air entry. No stridor, decreased air movement or transmitted upper airway sounds. No decreased breath sounds, wheezing, rhonchi or rales.   Chest:      Chest wall: No tenderness.   Abdominal:      General: Bowel sounds are normal. There is no distension.      Palpations: Abdomen is soft. Abdomen is not rigid.  There is no fluid wave, hepatomegaly, splenomegaly or mass.      Tenderness: There is no abdominal tenderness. There is no right CVA tenderness, left CVA tenderness, guarding or rebound.   Genitourinary:     Comments: Holding  and breast exam done by GYN.  Musculoskeletal:      Cervical back: Neck supple. No tenderness.      Right lower leg: No edema.      Left lower leg: No edema.   Lymphadenopathy:      Head:      Right side of head: No submental, submandibular, preauricular, posterior auricular or occipital adenopathy.      Left side of head: No submental, submandibular, preauricular, posterior auricular or occipital adenopathy.      Cervical: No cervical adenopathy.      Right cervical: No superficial, deep or posterior cervical adenopathy.     Left cervical: No superficial, deep or posterior cervical adenopathy.      Upper Body:      Right upper body: No supraclavicular adenopathy.      Left upper body: No supraclavicular adenopathy.   Skin:     General: Skin is warm and dry.      Coloration: Skin is not pale.      Findings: No erythema or rash.   Neurological:      Mental Status: She is alert and oriented to person, place, and time.   Psychiatric:         Mood and Affect: Mood normal.         Speech: Speech normal.         Behavior: Behavior normal. Behavior is cooperative.              ASSESSMENT/PLAN:     Encounter Diagnoses   Name Primary?    Adult general medical examination Check urine. Check blood.    Yes    Mixed hyperlipidemia Check blood.        Pap smear for cervical cancer screening Up to date.        Vaccine counseling Holding Tdap and covid booster and flu vaccines.           Miscarriage (HCC) Check blood FU gyne.         Orders Placed This Encounter   Procedures    Lipid Panel    CBC With Differential With Platelet    Comp Metabolic Panel (14)    TSH W Reflex To Free T4    URINALYSIS, AUTO, W/O SCOPE    LH (Luteinizing Hormone)    FSH    Estrogens Fractionated, S [E]    Magnesium       Meds  This Visit:  Requested Prescriptions      No prescriptions requested or ordered in this encounter       Imaging & Referrals:  None      RTC 1 yr. for physical.          [1] No Known Allergies